# Patient Record
Sex: FEMALE | Race: WHITE | NOT HISPANIC OR LATINO | URBAN - METROPOLITAN AREA
[De-identification: names, ages, dates, MRNs, and addresses within clinical notes are randomized per-mention and may not be internally consistent; named-entity substitution may affect disease eponyms.]

---

## 2023-12-28 ENCOUNTER — OFFICE VISIT (OUTPATIENT)
Dept: URGENT CARE | Facility: CLINIC | Age: 18
End: 2023-12-28
Payer: COMMERCIAL

## 2023-12-28 VITALS
RESPIRATION RATE: 20 BRPM | TEMPERATURE: 98.1 F | BODY MASS INDEX: 22.71 KG/M2 | HEART RATE: 100 BPM | OXYGEN SATURATION: 97 % | SYSTOLIC BLOOD PRESSURE: 135 MMHG | WEIGHT: 145 LBS | DIASTOLIC BLOOD PRESSURE: 84 MMHG

## 2023-12-28 DIAGNOSIS — J06.9 ACUTE URI: ICD-10-CM

## 2023-12-28 DIAGNOSIS — J45.21 MILD INTERMITTENT ASTHMA WITH EXACERBATION (HHS-HCC): Primary | ICD-10-CM

## 2023-12-28 DIAGNOSIS — R05.1 ACUTE COUGH: ICD-10-CM

## 2023-12-28 PROCEDURE — 99203 OFFICE O/P NEW LOW 30 MIN: CPT | Performed by: FAMILY MEDICINE

## 2023-12-28 RX ORDER — PREDNISONE 20 MG/1
40 TABLET ORAL DAILY
Qty: 10 TABLET | Refills: 0 | Status: SHIPPED | OUTPATIENT
Start: 2023-12-28 | End: 2024-01-02

## 2023-12-28 RX ORDER — LEVOCETIRIZINE DIHYDROCHLORIDE 5 MG/1
5 TABLET, FILM COATED ORAL
COMMUNITY
Start: 2022-12-09

## 2023-12-28 RX ORDER — MONTELUKAST SODIUM 10 MG/1
1 TABLET ORAL NIGHTLY
COMMUNITY
Start: 2023-10-08

## 2023-12-28 RX ORDER — ALBUTEROL SULFATE 90 UG/1
AEROSOL, METERED RESPIRATORY (INHALATION)
Qty: 6.7 G | Refills: 0 | Status: SHIPPED | OUTPATIENT
Start: 2023-12-28

## 2023-12-28 RX ORDER — INHALER, ASSIST DEVICES
SPACER (EA) MISCELLANEOUS
Qty: 1 EACH | Refills: 0 | Status: SHIPPED | OUTPATIENT
Start: 2023-12-28

## 2023-12-28 ASSESSMENT — ENCOUNTER SYMPTOMS
DIARRHEA: 0
COUGH: 1
WHEEZING: 1
VOMITING: 0
CONSTIPATION: 0
SINUS PRESSURE: 1
FEVER: 0
HEADACHES: 1
DYSURIA: 0
RHINORRHEA: 1
CHEST TIGHTNESS: 0
FREQUENCY: 0
ABDOMINAL PAIN: 0
NAUSEA: 0
CHILLS: 0
SORE THROAT: 0
PALPITATIONS: 0
SHORTNESS OF BREATH: 1

## 2023-12-28 ASSESSMENT — PAIN SCALES - GENERAL: PAINLEVEL: 3

## 2023-12-28 NOTE — PATIENT INSTRUCTIONS
You have a viral upper respiratory infection with cough.  Please increase your oral fluids for the next 7-10 days  Please take prednisone as a single dose early in the day with food  May use Tylenol 325 mg, one or 2 tablets by mouth every 4-6 hours as needed for additional pain relief  May use Mucinex as needed for  nasal congestion as per label instructions  Please use inhaler with spacer device 2 puffs every 4-6 hours for the next 7-10 days, decrease frequency of use as your symptoms improve. Please take 5-6 slow deep breaths from the spacer chamber after activating the inhaler. Than wait for 1 minute activator inhaler and repeat 5-6 more breaths.  If no better in 5-7 days please follow-up with primary care provider  If fever greater than 102 degrees Fahrenheit, chills, nausea, vomiting, increased difficulty breathing, difficulty swallowing, increased wheezing, shortness of breath please go immediately to emergency room for further evaluation  This note was generated by voice recognition software. Minor transcription/grammatical errors may be present. Please call for clarification.

## 2023-12-28 NOTE — PROGRESS NOTES
Subjective   Patient ID: Serena Abdi is a 18 y.o. female.     HPI: 18-year-old  female presents with complaint of cough and nasal congestion.  States she has had symptoms for the past 3 days.  She rates her pain as 3 of 10.  She is a non-smoker.      History provided by:  Patient  URI  Presenting symptoms: congestion, cough, ear pain and rhinorrhea    Presenting symptoms: no fever and no sore throat    Associated symptoms: headaches and wheezing        The following portions of the chart were reviewed this encounter and updated as appropriate:  Allergies  Meds  Problems  Med Hx  Surg Hx  Fam Hx         Review of Systems   Constitutional:  Negative for chills and fever.   HENT:  Positive for congestion, ear pain, rhinorrhea and sinus pressure. Negative for sore throat.    Respiratory:  Positive for cough, shortness of breath and wheezing. Negative for chest tightness.    Cardiovascular:  Negative for palpitations.   Gastrointestinal:  Negative for abdominal pain, constipation, diarrhea, nausea and vomiting.   Genitourinary:  Negative for dysuria and frequency.   Neurological:  Positive for headaches.     Objective   Physical Exam  Vital signs are reviewed. Alert and oriented x3 with normal mood and affect  Patient is well nourished, well-developed, alert and in no acute distress    External eyes, orbits, conjunctiva and eyelids are normal in appearance  Pupils are equal, round, reactive to light and accommodation, extraocular movements intact    External ears appear normal  External canals are normal in appearance  Right tympanic membrane is intact and pale gray in appearance  Left tympanic membrane is intact and pale gray in appearance  There is no middle ear effusion noted on the right  There is no middle ear effusion noted on the left  External appearance of the nose is normal  Nasal mucosa, septum, turbinates are  slightly reddened and mildly swollen in appearance  There is  thin white nasal  discharge in both nares    Oral mucosa is uniformly pink and moist  Palate is pink, symmetric and intact  Tongue is moist, mobile and midline  Posterior pharynx mildly erythematous with no concretions or exudates present  No cervical lymphadenopathy palpated    Heart has regular rate and rhythm. No murmurs, rubs or gallops are auscultated at this exam.    Respiratory rate rhythm and effort are normal. Breath sounds bilaterally are  slightly coarse on auscultation without crackles, rhonchi, wheezes or friction rub.    Abdomen: Normal bowel sounds on auscultation. Soft, nontender without rebound or rigidity on palpation          Procedures    Assessment/Plan   Diagnoses and all orders for this visit:  Mild intermittent asthma with exacerbation  -     inhalational spacing device (Aerochamber MV) inhaler; Use as instructed  -     predniSONE (Deltasone) 20 mg tablet; Take 2 tablets (40 mg) by mouth once daily for 5 days.  -     albuterol (Proventil HFA) 90 mcg/actuation inhaler; Take 1 puff via spacer,  take 5-6 easy breaths.  Rest 2 minutes.  Repeat x1.  May use every 4-6 hours as needed  Acute cough  Acute URI  -     inhalational spacing device (Aerochamber MV) inhaler; Use as instructed  -     predniSONE (Deltasone) 20 mg tablet; Take 2 tablets (40 mg) by mouth once daily for 5 days.  -     albuterol (Proventil HFA) 90 mcg/actuation inhaler; Take 1 puff via spacer,  take 5-6 easy breaths.  Rest 2 minutes.  Repeat x1.  May use every 4-6 hours as needed

## 2024-12-26 ENCOUNTER — OFFICE VISIT (OUTPATIENT)
Dept: URGENT CARE | Age: 19
End: 2024-12-26
Payer: COMMERCIAL

## 2024-12-26 VITALS
SYSTOLIC BLOOD PRESSURE: 113 MMHG | BODY MASS INDEX: 21.22 KG/M2 | WEIGHT: 140 LBS | DIASTOLIC BLOOD PRESSURE: 68 MMHG | OXYGEN SATURATION: 98 % | TEMPERATURE: 98.2 F | HEART RATE: 78 BPM | RESPIRATION RATE: 16 BRPM | HEIGHT: 68 IN

## 2024-12-26 DIAGNOSIS — R05.9 COUGH, UNSPECIFIED TYPE: ICD-10-CM

## 2024-12-26 DIAGNOSIS — J01.00 ACUTE NON-RECURRENT MAXILLARY SINUSITIS: Primary | ICD-10-CM

## 2024-12-26 LAB
POC RAPID INFLUENZA A: NEGATIVE
POC RAPID INFLUENZA B: NEGATIVE
POC SARS-COV-2 AG BINAX: NORMAL

## 2024-12-26 RX ORDER — AMOXICILLIN AND CLAVULANATE POTASSIUM 875; 125 MG/1; MG/1
1 TABLET, FILM COATED ORAL 2 TIMES DAILY
Qty: 20 TABLET | Refills: 0 | Status: SHIPPED | OUTPATIENT
Start: 2024-12-26 | End: 2025-01-05

## 2024-12-26 RX ORDER — PREDNISONE 20 MG/1
20 TABLET ORAL 2 TIMES DAILY
Qty: 10 TABLET | Refills: 0 | Status: SHIPPED | OUTPATIENT
Start: 2024-12-26 | End: 2024-12-31

## 2024-12-26 ASSESSMENT — ENCOUNTER SYMPTOMS
FEVER: 1
COUGH: 1

## 2024-12-26 NOTE — PROGRESS NOTES
"Subjective   Patient ID: Serena Abdi is a 19 y.o. female. They present today with a chief complaint of Earache (Both ears ), Sinusitis, Fever, and Cough (X2 days).    History of Present Illness  Subjective  Serena Abdi is a 19 y.o. female who presents for evaluation of symptoms of a URI. Symptoms include bilateral ear pressure/pain, cough described as productive, fever, nasal congestion, and sinus pressure. Onset of symptoms was 2 days ago and has been gradually worsening since that time. She has a history of asthma; she has been needing her inhaler every 6  hours.          Earache   Associated symptoms include coughing.   Sinusitis  Associated symptoms: cough, ear pain and fever    Fever   Associated symptoms include coughing and ear pain.   Cough  Associated symptoms include ear pain and a fever.       Past Medical History  Allergies as of 12/26/2024    (No Known Allergies)       (Not in a hospital admission)       Past Medical History:   Diagnosis Date    Personal history of other diseases of the respiratory system 06/25/2017    History of allergic rhinitis    Personal history of other diseases of the respiratory system     History of asthma       Past Surgical History:   Procedure Laterality Date    OTHER SURGICAL HISTORY  08/10/2022    Adenoidectomy            Review of Systems  Review of Systems   Constitutional:  Positive for fever.   HENT:  Positive for ear pain.    Respiratory:  Positive for cough.                                   Objective    Vitals:    12/26/24 1309   BP: 113/68   Pulse: 78   Resp: 16   Temp: 36.8 °C (98.2 °F)   TempSrc: Oral   SpO2: 98%   Weight: 63.5 kg (140 lb)   Height: 1.727 m (5' 8\")     Patient's last menstrual period was 12/15/2024.    Physical Exam  Constitutional:       General: She is not in acute distress.     Appearance: Normal appearance. She is not toxic-appearing.   HENT:      Right Ear: Tympanic membrane, ear canal and external ear normal.      Left Ear: Tympanic " membrane, ear canal and external ear normal.      Nose: Congestion present.      Mouth/Throat:      Mouth: Mucous membranes are moist.      Pharynx: Oropharynx is clear.   Eyes:      Extraocular Movements: Extraocular movements intact.      Conjunctiva/sclera: Conjunctivae normal.      Pupils: Pupils are equal, round, and reactive to light.   Cardiovascular:      Rate and Rhythm: Normal rate and regular rhythm.      Pulses: Normal pulses.      Heart sounds: Normal heart sounds.   Pulmonary:      Effort: Pulmonary effort is normal.      Breath sounds: No wheezing, rhonchi or rales.   Musculoskeletal:      Cervical back: Normal range of motion and neck supple. No rigidity or tenderness.   Lymphadenopathy:      Cervical: No cervical adenopathy.   Neurological:      Mental Status: She is alert.         Procedures    Point of Care Test & Imaging Results from this visit  Results for orders placed or performed in visit on 12/26/24   POCT Covid-19 Rapid Antigen   Result Value Ref Range    POC TYSHAWN-COV-2 AG  Presumptive negative test for SARS-CoV-2 (no antigen detected)     Presumptive negative test for SARS-CoV-2 (no antigen detected)   POCT Influenza A/B manually resulted   Result Value Ref Range    POC Rapid Influenza A Negative Negative    POC Rapid Influenza B Negative Negative      No results found.    Diagnostic study results (if any) were reviewed by Rika Rivero MD.    Assessment/Plan   Allergies, medications, history, and pertinent labs/EKGs/Imaging reviewed by Rika Rivero MD.     Medical Decision Making      Orders and Diagnoses  Diagnoses and all orders for this visit:  Cough, unspecified type  -     POCT Covid-19 Rapid Antigen  -     POCT Influenza A/B manually resulted      Medical Admin Record      Patient disposition: Home    Electronically signed by Rika Rivero MD  1:18 PM

## 2024-12-26 NOTE — PATIENT INSTRUCTIONS
Antibiotics, Prednisone as directed  Albuterol as needed  Follow up with new or worsening symptoms